# Patient Record
Sex: MALE | Race: OTHER | URBAN - METROPOLITAN AREA
[De-identification: names, ages, dates, MRNs, and addresses within clinical notes are randomized per-mention and may not be internally consistent; named-entity substitution may affect disease eponyms.]

---

## 2021-12-14 ENCOUNTER — EMERGENCY VISIT (OUTPATIENT)
Dept: URBAN - METROPOLITAN AREA CLINIC 16 | Facility: CLINIC | Age: 61
End: 2021-12-14

## 2021-12-14 DIAGNOSIS — H43.811: ICD-10-CM

## 2021-12-14 DIAGNOSIS — H43.392: ICD-10-CM

## 2021-12-14 DIAGNOSIS — H25.13: ICD-10-CM

## 2021-12-14 PROCEDURE — 92004 COMPRE OPH EXAM NEW PT 1/>: CPT

## 2021-12-14 ASSESSMENT — KERATOMETRY
OD_AXISANGLE2_DEGREES: 81
OS_K1POWER_DIOPTERS: 42.00
OS_AXISANGLE2_DEGREES: 127
OS_K2POWER_DIOPTERS: 41.50
OD_K1POWER_DIOPTERS: 41.50
OD_AXISANGLE_DEGREES: 171
OD_K2POWER_DIOPTERS: 41.00
OS_AXISANGLE_DEGREES: 037

## 2021-12-14 ASSESSMENT — TONOMETRY
OD_IOP_MMHG: 20
OS_IOP_MMHG: 19

## 2021-12-14 ASSESSMENT — VISUAL ACUITY
OS_SC: 20/50+2
OU_SC: 20/20-2
OD_SC: 20/25-2

## 2021-12-14 NOTE — PATIENT DISCUSSION
Discussed Rx glasses vs CL OS for distance. Patient will look into vision insurance before proceeding.

## 2022-01-25 ENCOUNTER — FOLLOW UP (OUTPATIENT)
Dept: URBAN - METROPOLITAN AREA CLINIC 16 | Facility: CLINIC | Age: 62
End: 2022-01-25

## 2022-01-25 DIAGNOSIS — H43.392: ICD-10-CM

## 2022-01-25 DIAGNOSIS — H43.811: ICD-10-CM

## 2022-01-25 DIAGNOSIS — H25.13: ICD-10-CM

## 2022-01-25 DIAGNOSIS — H04.123: ICD-10-CM

## 2022-01-25 PROCEDURE — 92014 COMPRE OPH EXAM EST PT 1/>: CPT

## 2022-01-25 ASSESSMENT — KERATOMETRY
OS_AXISANGLE_DEGREES: 037
OD_AXISANGLE_DEGREES: 171
OD_K2POWER_DIOPTERS: 41.00
OS_K2POWER_DIOPTERS: 41.50
OS_K1POWER_DIOPTERS: 42.00
OD_AXISANGLE2_DEGREES: 81
OS_AXISANGLE2_DEGREES: 127
OD_K1POWER_DIOPTERS: 41.50

## 2022-01-25 ASSESSMENT — VISUAL ACUITY
OU_SC: 20/25+1
OD_SC: 20/30+2
OS_SC: 20/60-2

## 2022-01-25 ASSESSMENT — TONOMETRY
OD_IOP_MMHG: 17
OS_IOP_MMHG: 18

## 2022-01-25 NOTE — PATIENT DISCUSSION
Patient interested in CL option - discussed DVO for OS only or MF OU. Patient undecided but would like to try DVO OS only CL first, explained will need OTC readers over top. Schedule CL I/R. Patient would like monthly replacement.

## 2022-01-25 NOTE — PATIENT DISCUSSION
Due to large central floaters OU, discussed R/B of vitreolysis. Patient does not feel that floaters are affecting ADLs significantly at this time. Can refer to Dr Malena Jay if become more symptomatic.

## 2022-02-08 ENCOUNTER — CONTACT LENSES/GLASSES VISIT (OUTPATIENT)
Dept: URBAN - METROPOLITAN AREA CLINIC 16 | Facility: CLINIC | Age: 62
End: 2022-02-08

## 2022-02-08 DIAGNOSIS — H52.12: ICD-10-CM

## 2022-02-08 PROCEDURE — 92310C CONTACT LENS 75

## 2022-02-08 ASSESSMENT — KERATOMETRY
OD_K1POWER_DIOPTERS: 41.50
OD_AXISANGLE_DEGREES: 171
OS_AXISANGLE_DEGREES: 037
OD_AXISANGLE2_DEGREES: 81
OS_K1POWER_DIOPTERS: 42.00
OS_K2POWER_DIOPTERS: 41.50
OD_K2POWER_DIOPTERS: 41.00
OS_AXISANGLE2_DEGREES: 127

## 2022-02-08 NOTE — PATIENT DISCUSSION
Due to large central floaters OU, discussed R/B of vitreolysis. Patient does not feel that floaters are affecting ADLs significantly at this time. Can refer to Dr Jose Suarez if become more symptomatic.

## 2022-07-08 RX ORDER — SILDENAFIL 100 MG/1
TABLET, FILM COATED ORAL
COMMUNITY
End: 2022-10-03 | Stop reason: SDUPTHER

## 2022-07-08 RX ORDER — LISINOPRIL 5 MG/1
TABLET ORAL
COMMUNITY
End: 2022-09-01

## 2022-07-08 RX ORDER — ATORVASTATIN CALCIUM 40 MG/1
TABLET, FILM COATED ORAL
COMMUNITY
End: 2022-09-01

## 2022-07-08 RX ORDER — METOPROLOL SUCCINATE 25 MG/1
TABLET, EXTENDED RELEASE ORAL
COMMUNITY
End: 2022-09-01

## 2022-09-26 PROBLEM — F17.210 TOBACCO DEPENDENCE DUE TO CIGARETTES: Status: ACTIVE | Noted: 2022-09-26

## 2022-09-26 PROBLEM — I25.10 CORONARY ARTERY DISEASE INVOLVING NATIVE CORONARY ARTERY OF NATIVE HEART WITHOUT ANGINA PECTORIS: Status: ACTIVE | Noted: 2022-09-26

## 2022-09-26 PROBLEM — R73.01 IFG (IMPAIRED FASTING GLUCOSE): Status: ACTIVE | Noted: 2022-09-26

## 2022-09-26 PROBLEM — K63.5 POLYP OF COLON: Status: ACTIVE | Noted: 2022-09-26

## 2022-09-26 PROBLEM — E78.5 HYPERLIPIDEMIA: Status: ACTIVE | Noted: 2022-09-26

## 2022-09-26 PROBLEM — I10 ESSENTIAL HYPERTENSION: Status: ACTIVE | Noted: 2022-09-26

## 2022-09-26 PROBLEM — N52.9 ERECTILE DYSFUNCTION: Status: ACTIVE | Noted: 2022-09-26

## 2023-01-17 ENCOUNTER — COMPREHENSIVE EXAM (OUTPATIENT)
Dept: URBAN - METROPOLITAN AREA CLINIC 16 | Facility: CLINIC | Age: 63
End: 2023-01-17

## 2023-01-17 DIAGNOSIS — H52.12: ICD-10-CM

## 2023-01-17 PROCEDURE — 92014 COMPRE OPH EXAM EST PT 1/>: CPT

## 2023-01-17 PROCEDURE — 92310C CONTACT LENS 75

## 2023-01-17 PROCEDURE — 92015 DETERMINE REFRACTIVE STATE: CPT

## 2023-01-17 ASSESSMENT — KERATOMETRY
OD_K2POWER_DIOPTERS: 41.00
OS_K2POWER_DIOPTERS: 42.25
OS_AXISANGLE2_DEGREES: 48
OD_K1POWER_DIOPTERS: 41.25
OD_AXISANGLE_DEGREES: 167
OS_K1POWER_DIOPTERS: 41.75
OS_AXISANGLE_DEGREES: 138
OD_AXISANGLE2_DEGREES: 77

## 2023-01-17 ASSESSMENT — VISUAL ACUITY
OD_SC: 20/25-1
OU_SC: 20/20
OS_SC: 20/200

## 2023-01-17 ASSESSMENT — TONOMETRY
OS_IOP_MMHG: 18
OD_IOP_MMHG: 15

## 2023-01-17 NOTE — PATIENT DISCUSSION
New CL trial given for OS. Patient understands this is for DVO and he will need readers over top for intermediate/near tasks.

## 2023-01-17 NOTE — PATIENT DISCUSSION
Recommended regular use of ATs prn OU.  Refresh/Systane if no contact in or BLINK for contacts if OS CL in.

## 2024-03-19 ENCOUNTER — ESTABLISHED PATIENT (OUTPATIENT)
Dept: URBAN - METROPOLITAN AREA CLINIC 10 | Facility: CLINIC | Age: 64
End: 2024-03-19

## 2024-03-19 DIAGNOSIS — H04.123: ICD-10-CM

## 2024-03-19 DIAGNOSIS — H25.13: ICD-10-CM

## 2024-03-19 DIAGNOSIS — H43.392: ICD-10-CM

## 2024-03-19 DIAGNOSIS — H52.12: ICD-10-CM

## 2024-03-19 DIAGNOSIS — H43.811: ICD-10-CM

## 2024-03-19 PROCEDURE — 92015 DETERMINE REFRACTIVE STATE: CPT

## 2024-03-19 PROCEDURE — 92014 COMPRE OPH EXAM EST PT 1/>: CPT

## 2024-03-19 PROCEDURE — 92310C CONTACT LENS 75

## 2024-03-19 ASSESSMENT — KERATOMETRY
OD_AXISANGLE_DEGREES: 82
OS_K1POWER_DIOPTERS: 41.50
OD_AXISANGLE2_DEGREES: 172
OS_K2POWER_DIOPTERS: 42.00
OD_K1POWER_DIOPTERS: 41.00
OD_K2POWER_DIOPTERS: 41.50
OS_AXISANGLE_DEGREES: 127
OS_AXISANGLE2_DEGREES: 37

## 2024-03-19 ASSESSMENT — VISUAL ACUITY
OD_SC: 20/40-1
OD_PH: 20/60-1
OS_CC: 20/60
OS_SC: 20/400
OS_BCVA: 20/30-2
OD_BCVA: 20/30

## 2024-03-19 ASSESSMENT — TONOMETRY
OD_IOP_MMHG: 18
OS_IOP_MMHG: 22